# Patient Record
Sex: MALE | Race: WHITE | Employment: FULL TIME | URBAN - METROPOLITAN AREA
[De-identification: names, ages, dates, MRNs, and addresses within clinical notes are randomized per-mention and may not be internally consistent; named-entity substitution may affect disease eponyms.]

---

## 2022-06-06 ENCOUNTER — TRANSCRIBE ORDER (OUTPATIENT)
Dept: GENERAL RADIOLOGY | Age: 47
End: 2022-06-06

## 2022-06-06 DIAGNOSIS — Z13.6 SCREENING FOR HEART DISEASE: Primary | ICD-10-CM

## 2022-06-15 ENCOUNTER — HOSPITAL ENCOUNTER (OUTPATIENT)
Dept: CT IMAGING | Age: 47
Discharge: HOME | End: 2022-06-15
Attending: FAMILY MEDICINE

## 2022-06-15 ENCOUNTER — HOSPITAL ENCOUNTER (OUTPATIENT)
Dept: ULTRASOUND IMAGING | Age: 47
Discharge: HOME | End: 2022-06-15
Attending: FAMILY MEDICINE

## 2022-06-15 DIAGNOSIS — Z13.6 SCREENING FOR HEART DISEASE: ICD-10-CM

## 2022-06-15 LAB
DIST AORTIC AP: 1.9 CM
LEFT ABI: 1.23
LEFT ARM BP: 149 MMHG
LEFT ICA/CCA SYS: 0.8
LEFT POSTERIOR TIBIAL: 184 MMHG
MID AORTIC AP: 2.1 CM
PROX AORTIC AP: 2.5 CM
RIGHT ABI: 1.24
RIGHT ARM BP: 150 MMHG
RIGHT ICA/CCA SYS: 1
RIGHT POSTERIOR TIBIAL: 186 MMHG
VAS LEFT DORSALIS PEDIS BP: 180 MMHG
VAS RIGHT DORSALIS PEDIS BP: 171 MMHG

## 2022-06-15 PROCEDURE — 93922 UPR/L XTREMITY ART 2 LEVELS: CPT

## 2022-06-15 PROCEDURE — 75571 CT HRT W/O DYE W/CA TEST: CPT

## 2025-03-03 ENCOUNTER — OFFICE VISIT (OUTPATIENT)
Age: 50
End: 2025-03-03

## 2025-03-03 VITALS
OXYGEN SATURATION: 96 % | TEMPERATURE: 98.5 F | HEART RATE: 92 BPM | BODY MASS INDEX: 33.09 KG/M2 | WEIGHT: 244 LBS | SYSTOLIC BLOOD PRESSURE: 130 MMHG | DIASTOLIC BLOOD PRESSURE: 90 MMHG | RESPIRATION RATE: 16 BRPM

## 2025-03-03 DIAGNOSIS — J20.9 ACUTE BRONCHITIS, UNSPECIFIED ORGANISM: Primary | ICD-10-CM

## 2025-03-03 DIAGNOSIS — R05.1 ACUTE COUGH: ICD-10-CM

## 2025-03-03 RX ORDER — FLUTICASONE FUROATE AND VILANTEROL TRIFENATATE 100; 25 UG/1; UG/1
POWDER RESPIRATORY (INHALATION)
COMMUNITY

## 2025-03-03 RX ORDER — PREDNISONE 20 MG/1
40 TABLET ORAL DAILY
Qty: 10 TABLET | Refills: 0 | Status: SHIPPED | OUTPATIENT
Start: 2025-03-03 | End: 2025-03-08

## 2025-03-03 RX ORDER — IPRATROPIUM BROMIDE 21 UG/1
SPRAY, METERED NASAL
COMMUNITY

## 2025-03-03 RX ORDER — AZITHROMYCIN 250 MG/1
TABLET, FILM COATED ORAL
Qty: 6 TABLET | Refills: 0 | Status: SHIPPED | OUTPATIENT
Start: 2025-03-03 | End: 2025-03-13

## 2025-03-03 RX ORDER — SUMATRIPTAN 50 MG/1
TABLET, FILM COATED ORAL
COMMUNITY
Start: 2025-01-21

## 2025-03-03 RX ORDER — AZELASTINE HCL 205.5 UG/1
SPRAY NASAL
COMMUNITY
Start: 2024-08-01

## 2025-03-03 RX ORDER — ALBUTEROL SULFATE 90 UG/1
2 INHALANT RESPIRATORY (INHALATION) EVERY 4 HOURS PRN
Qty: 18 G | Refills: 0 | Status: SHIPPED | OUTPATIENT
Start: 2025-03-03

## 2025-03-03 RX ORDER — LOSARTAN POTASSIUM AND HYDROCHLOROTHIAZIDE 12.5; 1 MG/1; MG/1
1 TABLET ORAL DAILY
COMMUNITY

## 2025-03-03 ASSESSMENT — ENCOUNTER SYMPTOMS: COUGH: 1

## 2025-03-03 NOTE — PROGRESS NOTES
Pulmonary effort is normal. No respiratory distress.      Breath sounds: Wheezing and rhonchi present. No rales.   Musculoskeletal:      Cervical back: Normal range of motion and neck supple. No tenderness.   Lymphadenopathy:      Cervical: No cervical adenopathy.   Skin:     General: Skin is warm and dry.   Neurological:      General: No focal deficit present.      Mental Status: He is alert and oriented to person, place, and time.               An electronic signature was used to authenticate this note.    SHONDA Renner - NP

## 2025-03-03 NOTE — PATIENT INSTRUCTIONS
Symptoms consistent with acute bacterial bronchitis  I do not see anything obvious on your chest x-ray to suggest pneumonia, I am awaiting the radiologist final read and I will contact you with results once back  Vital signs are stable, no shortness of breath or red flag symptoms to warrant further evaluation in ED at this time  Will treat with Azithromycin due to prolonged duration and failure to improve with conservative treatments  Prednisone, 40 mg daily for 5 days  Albuterol inhaler every 4-6 hours as needed  Mucinex DM over-the-counter as needed for cough and congestion  Lots of fluids, plenty of rest  Hot tea with honey, throat lozenges  Follow up with PCP if symptoms persist or worsen  Go to ED if you develop any shortness of breath, chest pain or if you are unable to tolerate food or fluids